# Patient Record
Sex: MALE | Race: WHITE | Employment: FULL TIME | URBAN - NONMETROPOLITAN AREA
[De-identification: names, ages, dates, MRNs, and addresses within clinical notes are randomized per-mention and may not be internally consistent; named-entity substitution may affect disease eponyms.]

---

## 2020-12-17 RX ORDER — DEXAMETHASONE SODIUM PHOSPHATE 4 MG/ML
4 INJECTION, SOLUTION INTRA-ARTICULAR; INTRALESIONAL; INTRAMUSCULAR; INTRAVENOUS; SOFT TISSUE ONCE
Status: CANCELLED | OUTPATIENT
Start: 2020-12-17 | End: 2020-12-17

## 2020-12-17 RX ORDER — BUPIVACAINE HYDROCHLORIDE 2.5 MG/ML
5 INJECTION, SOLUTION EPIDURAL; INFILTRATION; INTRACAUDAL ONCE
Status: CANCELLED | OUTPATIENT
Start: 2020-12-17 | End: 2020-12-17

## 2020-12-21 ENCOUNTER — HOSPITAL ENCOUNTER (OUTPATIENT)
Dept: INTERVENTIONAL RADIOLOGY/VASCULAR | Age: 45
Discharge: HOME | End: 2020-12-21
Payer: COMMERCIAL

## 2020-12-21 VITALS
RESPIRATION RATE: 16 BRPM | OXYGEN SATURATION: 98 % | SYSTOLIC BLOOD PRESSURE: 125 MMHG | WEIGHT: 230 LBS | DIASTOLIC BLOOD PRESSURE: 96 MMHG | TEMPERATURE: 97.6 F | HEART RATE: 66 BPM

## 2020-12-21 PROCEDURE — 2500000003 HC RX 250 WO HCPCS

## 2020-12-21 PROCEDURE — 6360000004 HC RX CONTRAST MEDICATION: Performed by: RADIOLOGY

## 2020-12-21 PROCEDURE — 2709999900 HC NON-CHARGEABLE SUPPLY

## 2020-12-21 PROCEDURE — 6360000002 HC RX W HCPCS

## 2020-12-21 RX ORDER — BUPIVACAINE HYDROCHLORIDE 2.5 MG/ML
5 INJECTION, SOLUTION EPIDURAL; INFILTRATION; INTRACAUDAL ONCE
Status: COMPLETED | OUTPATIENT
Start: 2020-12-21 | End: 2020-12-21

## 2020-12-21 RX ORDER — DEXAMETHASONE SODIUM PHOSPHATE 4 MG/ML
4 INJECTION, SOLUTION INTRA-ARTICULAR; INTRALESIONAL; INTRAMUSCULAR; INTRAVENOUS; SOFT TISSUE ONCE
Status: COMPLETED | OUTPATIENT
Start: 2020-12-21 | End: 2020-12-21

## 2020-12-21 RX ORDER — M-VIT,TX,IRON,MINS/CALC/FOLIC 27MG-0.4MG
1 TABLET ORAL DAILY
COMMUNITY

## 2020-12-21 RX ADMIN — IOHEXOL 1 ML: 180 INJECTION INTRAVENOUS at 13:41

## 2020-12-21 RX ADMIN — DEXAMETHASONE SODIUM PHOSPHATE 4 MG: 4 INJECTION, SOLUTION INTRA-ARTICULAR; INTRALESIONAL; INTRAMUSCULAR; INTRAVENOUS; SOFT TISSUE at 13:43

## 2020-12-21 RX ADMIN — BUPIVACAINE HYDROCHLORIDE 2 ML: 2.5 INJECTION, SOLUTION EPIDURAL; INFILTRATION; INTRACAUDAL at 13:43

## 2020-12-21 ASSESSMENT — PAIN DESCRIPTION - DIRECTION
RADIATING_TOWARDS: RIGHT LEG TO FOOT
RADIATING_TOWARDS: RIGHT LEG TO FOOT

## 2020-12-21 ASSESSMENT — PAIN DESCRIPTION - PAIN TYPE
TYPE: CHRONIC PAIN
TYPE: CHRONIC PAIN

## 2020-12-21 ASSESSMENT — PAIN SCALES - GENERAL
PAINLEVEL_OUTOF10: 4

## 2020-12-21 ASSESSMENT — PAIN DESCRIPTION - LOCATION
LOCATION: BACK

## 2020-12-21 ASSESSMENT — PAIN DESCRIPTION - DESCRIPTORS
DESCRIPTORS: ACHING
DESCRIPTORS: ACHING
DESCRIPTORS: TIGHTNESS;BURNING
DESCRIPTORS: ACHING

## 2020-12-21 ASSESSMENT — PAIN DESCRIPTION - ORIENTATION
ORIENTATION: LOWER;MID
ORIENTATION: LOWER;MID
ORIENTATION: LOWER

## 2020-12-21 ASSESSMENT — PAIN - FUNCTIONAL ASSESSMENT: PAIN_FUNCTIONAL_ASSESSMENT: 0-10

## 2020-12-21 NOTE — H&P
Formulation and discussion of sedation / procedure plans, risks, benefits, side effects and alternatives with patient and/or responsible adult completed.     Electronically signed by Naye Curtis MD on 12/21/2020 at 1:44 PM

## 2020-12-21 NOTE — PROGRESS NOTES
Andrews Delgado ADMITTED FOR NERVE BLOCK PROCEDURE REVIEWED WITH PT VERBALIZED UNDERSTANDING. PEDAL PUSH AND PULL STRONG AND EQUAL BILATERALLY. Pt rights and responsibilities offered to pt to read.       _M___ Safety:       (Environmental)   [place] to environment   Ensure ID band is correct and in place/ allergy band as needed   Assess for fall risk   Initiate fall precautions as applicable (fall band, side rails, etc.)   Call light within reach   Bed in low position/ wheels locked    __M__ Pain:        Assess pain level and characteristics   Administer analgesics as ordered   Assess effectiveness of pain management and report to MD as needed    __M__ Knowledge Deficit:   Assess baseline knowledge   Provide teaching at level of understanding   Provide teaching via preferred learning method   Evaluate teaching effectiveness    __M__ Hemodynamic/Respiratory Status:        (PObtain weight/height   Assess vital signs/ LOC until patient meets discharge criteria   Monitor procedure site and notify MD of any issues    ___

## 2020-12-21 NOTE — PROGRESS NOTES
[address] [facility] INJECTION SITE SOFT AND DRY. PAIN REMAINS AT 4. NO NUMBNESS. LEG MOVEMENT GOOD. SNACK TAKEN. HOME INSTRUCTIONS TO PT VERBALIZED UNDERSTANDING  1421 GAIT STEADY. DRESSING DRY DENIES PAIN.   1422 DISCHARGED AMBULATORY STABLE HOME WITH FAMILY.

## 2020-12-21 NOTE — PROGRESS NOTES
1330 Pt arrived to special procedure room 2 for lumbar epidural.   1331 Procedure explained using teach back method. Pt states understanding. 1335 This procedure has been fully reviewed with the patient and written informed consent has been obtained. 1337 Patient assisted to table in prone position. Comfort ensured. 1338 Pre-procedure images obtained. 1339 Procedure begins. 1343 Procedure complete. 1346 Patient assisted to cart in semi fowlers position. Comfort ensured. Pedal push and pull strong and equal bilaterally. 1348 Patient transported to [facility] in stable condition.

## 2020-12-21 NOTE — OP NOTE
Department of Radiology  Post Procedure Progress Note    Pre-Procedure Diagnosis:  Lumbar radiculopathy     Procedure Performed:  Epidural block/steroid injection      Anesthesia: local     Findings: successful    Immediate Complications:  None    Estimated Blood Loss: minimal    SEE DICTATED PROCEDURE NOTE FOR COMPLETE DETAILS.       Electronically signed by Rangel Alva MD on 12/21/2020 at 1:45 PM